# Patient Record
Sex: MALE | Race: WHITE | NOT HISPANIC OR LATINO | URBAN - NONMETROPOLITAN AREA
[De-identification: names, ages, dates, MRNs, and addresses within clinical notes are randomized per-mention and may not be internally consistent; named-entity substitution may affect disease eponyms.]

---

## 2020-07-23 ENCOUNTER — OFFICE VISIT (OUTPATIENT)
Dept: URBAN - NONMETROPOLITAN AREA CLINIC 2 | Facility: CLINIC | Age: 25
End: 2020-07-23
Payer: COMMERCIAL

## 2020-07-23 DIAGNOSIS — F50.2 BULIMIA: ICD-10-CM

## 2020-07-23 DIAGNOSIS — K59.01 CONSTIPATION BY DELAYED COLONIC TRANSIT: ICD-10-CM

## 2020-07-23 DIAGNOSIS — R10.84 GENERALIZED ABDOMINAL PAIN: ICD-10-CM

## 2020-07-23 PROCEDURE — G8420 CALC BMI NORM PARAMETERS: HCPCS | Performed by: NURSE PRACTITIONER

## 2020-07-23 PROCEDURE — 99204 OFFICE O/P NEW MOD 45 MIN: CPT | Performed by: NURSE PRACTITIONER

## 2020-07-23 PROCEDURE — G9903 PT SCRN TBCO ID AS NON USER: HCPCS | Performed by: NURSE PRACTITIONER

## 2020-07-23 PROCEDURE — G8427 DOCREV CUR MEDS BY ELIG CLIN: HCPCS | Performed by: NURSE PRACTITIONER

## 2020-07-23 RX ORDER — TRAZODONE HYDROCHLORIDE 100 MG/1
1 TABLET AT BEDTIME TABLET, FILM COATED ORAL ONCE A DAY
Status: ACTIVE | COMMUNITY

## 2020-07-23 RX ORDER — OXCARBAZEPINE 150 MG/1
2 TABLETS TABLET, FILM COATED ORAL TWICE A DAY
Status: ACTIVE | COMMUNITY

## 2020-07-23 RX ORDER — DULOXETINE 60 MG/1
1 CAPSULE CAPSULE, DELAYED RELEASE PELLETS ORAL ONCE A DAY
Status: ACTIVE | COMMUNITY

## 2020-07-23 NOTE — HPI-TODAY'S VISIT:
Mr. Arpit Cain is a 25 year old male here for abdominal pain and constipation. He has had constipation most of his life with a BM every 3-4 days. He has been trying to loss weight and eating low carb. He also hurt his back which has made his less active. He denies any narcotics. Over the last month, he has been having cramping after eating with bloating. He has diffuse abdominal pain. Having a BM sometimes helps. He has a hx of bulimia. He started at 15 and lost 75 pounds. He stopped for a few years and started again 2 years ago. He is now seeing a therapist and has stopped for the last 4 months. He denies any blood in his stool or melena. CS

## 2020-07-25 LAB
A/G RATIO: 2
ALBUMIN: 4.5
ALKALINE PHOSPHATASE: 89
ALT (SGPT): 29
AST (SGOT): 22
BASO (ABSOLUTE): 0.1
BASOS: 1
BILIRUBIN, TOTAL: 0.3
BUN/CREATININE RATIO: 12
BUN: 16
C-REACTIVE PROTEIN, QUANT: 3
CALCIUM: 9.2
CARBON DIOXIDE, TOTAL: 25
CHLORIDE: 99
CREATININE: 1.32
DEAMIDATED GLIADIN ABS, IGA: 6
DEAMIDATED GLIADIN ABS, IGG: 3
EGFR IF AFRICN AM: 85
EGFR IF NONAFRICN AM: 73
ENDOMYSIAL ANTIBODY IGA: NEGATIVE
EOS (ABSOLUTE): 0.2
EOS: 3
GLOBULIN, TOTAL: 2.2
GLUCOSE: 74
HEMATOCRIT: 44
HEMATOLOGY COMMENTS:: (no result)
HEMOGLOBIN: 14.6
IMMATURE CELLS: (no result)
IMMATURE GRANS (ABS): 0
IMMATURE GRANULOCYTES: 0
IMMUNOGLOBULIN A, QN, SERUM: 196
LYMPHS (ABSOLUTE): 2.8
LYMPHS: 37
MCH: 29.9
MCHC: 33.2
MCV: 90
MONOCYTES(ABSOLUTE): 0.6
MONOCYTES: 8
NEUTROPHILS (ABSOLUTE): 3.8
NEUTROPHILS: 51
NRBC: (no result)
PLATELETS: 280
POTASSIUM: 4
PROTEIN, TOTAL: 6.7
RBC: 4.89
RDW: 12.5
SEDIMENTATION RATE-WESTERGREN: 9
SODIUM: 140
T-TRANSGLUTAMINASE (TTG) IGA: <2
T-TRANSGLUTAMINASE (TTG) IGG: <2
TSH: 1.7
WBC: 7.5

## 2020-07-30 ENCOUNTER — WEB ENCOUNTER (OUTPATIENT)
Dept: URBAN - NONMETROPOLITAN AREA CLINIC 2 | Facility: CLINIC | Age: 25
End: 2020-07-30

## 2020-07-30 RX ORDER — DULOXETINE 60 MG/1
1 CAPSULE CAPSULE, DELAYED RELEASE PELLETS ORAL ONCE A DAY
Status: ACTIVE | COMMUNITY

## 2020-07-30 RX ORDER — DICYCLOMINE HYDROCHLORIDE 10 MG/1
1 TABLET 30 MINS PRIOR TO A MEAL FOR CRAMPING CAPSULE ORAL THREE TIMES A DAY
Qty: 90 TABLET | Refills: 6 | OUTPATIENT
Start: 2020-07-31 | End: 2021-02-25

## 2020-07-30 RX ORDER — OXCARBAZEPINE 150 MG/1
2 TABLETS TABLET, FILM COATED ORAL TWICE A DAY
Status: ACTIVE | COMMUNITY

## 2020-07-30 RX ORDER — TRAZODONE HYDROCHLORIDE 100 MG/1
1 TABLET AT BEDTIME TABLET, FILM COATED ORAL ONCE A DAY
Status: ACTIVE | COMMUNITY

## 2020-07-30 RX ORDER — ONDANSETRON HYDROCHLORIDE 4 MG/1
1 TABLET EVERY 6-8HRS AS NEEDED FOR NAUSEA TABLET, FILM COATED ORAL
Qty: 30 | Refills: 3 | OUTPATIENT
Start: 2020-07-31

## 2020-07-31 ENCOUNTER — WEB ENCOUNTER (OUTPATIENT)
Dept: URBAN - NONMETROPOLITAN AREA CLINIC 2 | Facility: CLINIC | Age: 25
End: 2020-07-31

## 2020-07-31 RX ORDER — ONDANSETRON HYDROCHLORIDE 4 MG/1
1 TABLET EVERY 6-8HRS AS NEEDED FOR NAUSEA TABLET, FILM COATED ORAL
Qty: 30 | Refills: 3
Start: 2020-07-31

## 2020-07-31 RX ORDER — DICYCLOMINE HYDROCHLORIDE 10 MG/1
1 TABLET 30 MINS PRIOR TO A MEAL FOR CRAMPING CAPSULE ORAL THREE TIMES A DAY
Qty: 90 TABLET | Refills: 6
Start: 2020-07-31

## 2020-08-27 ENCOUNTER — WEB ENCOUNTER (OUTPATIENT)
Dept: URBAN - NONMETROPOLITAN AREA CLINIC 2 | Facility: CLINIC | Age: 25
End: 2020-08-27

## 2020-08-27 ENCOUNTER — OFFICE VISIT (OUTPATIENT)
Dept: URBAN - NONMETROPOLITAN AREA CLINIC 2 | Facility: CLINIC | Age: 25
End: 2020-08-27
Payer: COMMERCIAL

## 2020-08-27 DIAGNOSIS — R10.84 GENERALIZED ABDOMINAL PAIN: ICD-10-CM

## 2020-08-27 DIAGNOSIS — K59.01 CONSTIPATION BY DELAYED COLONIC TRANSIT: ICD-10-CM

## 2020-08-27 DIAGNOSIS — F50.2 BULIMIA: ICD-10-CM

## 2020-08-27 PROCEDURE — 99213 OFFICE O/P EST LOW 20 MIN: CPT | Performed by: NURSE PRACTITIONER

## 2020-08-27 PROCEDURE — G8420 CALC BMI NORM PARAMETERS: HCPCS | Performed by: NURSE PRACTITIONER

## 2020-08-27 PROCEDURE — G9903 PT SCRN TBCO ID AS NON USER: HCPCS | Performed by: NURSE PRACTITIONER

## 2020-08-27 PROCEDURE — G8427 DOCREV CUR MEDS BY ELIG CLIN: HCPCS | Performed by: NURSE PRACTITIONER

## 2020-08-27 RX ORDER — OXCARBAZEPINE 150 MG/1
2 TABLETS TABLET, FILM COATED ORAL TWICE A DAY
Status: ACTIVE | COMMUNITY

## 2020-08-27 RX ORDER — DULOXETINE 60 MG/1
1 CAPSULE CAPSULE, DELAYED RELEASE PELLETS ORAL ONCE A DAY
Status: ACTIVE | COMMUNITY

## 2020-08-27 RX ORDER — ONDANSETRON HYDROCHLORIDE 4 MG/1
1 TABLET EVERY 6-8HRS AS NEEDED FOR NAUSEA TABLET, FILM COATED ORAL
Qty: 30 | Refills: 3 | Status: ACTIVE | COMMUNITY
Start: 2020-07-31

## 2020-08-27 RX ORDER — DICYCLOMINE HYDROCHLORIDE 10 MG/1
1 TABLET 30 MINS PRIOR TO A MEAL FOR CRAMPING CAPSULE ORAL THREE TIMES A DAY
Qty: 90 TABLET | Refills: 6 | Status: ACTIVE | COMMUNITY
Start: 2020-07-31

## 2020-08-27 RX ORDER — TRAZODONE HYDROCHLORIDE 100 MG/1
1 TABLET AT BEDTIME TABLET, FILM COATED ORAL ONCE A DAY
Status: ACTIVE | COMMUNITY

## 2020-08-27 NOTE — HPI-OTHER HISTORIES
7/23/2020  Mr. Arpit Cain is a 25 year old male here for abdominal pain and constipation. He has had constipation most of his life with a BM every 3-4 days. He has been trying to loss weight and eating low carb. He also hurt his back which has made his less active. He denies any narcotics. Over the last month, he has been having cramping after eating with bloating. He has diffuse abdominal pain. Having a BM sometimes helps. He has a hx of bulimia. He started at 15 and lost 75 pounds. He stopped for a few years and started again 2 years ago. He is now seeing a therapist and has stopped for the last 4 months. He denies any blood in his stool or melena. CS

## 2020-09-12 ENCOUNTER — WEB ENCOUNTER (OUTPATIENT)
Dept: URBAN - NONMETROPOLITAN AREA CLINIC 2 | Facility: CLINIC | Age: 25
End: 2020-09-12

## 2020-09-22 ENCOUNTER — TELEPHONE ENCOUNTER (OUTPATIENT)
Dept: URBAN - METROPOLITAN AREA CLINIC 92 | Facility: CLINIC | Age: 25
End: 2020-09-22

## 2020-10-08 ENCOUNTER — WEB ENCOUNTER (OUTPATIENT)
Dept: URBAN - NONMETROPOLITAN AREA CLINIC 2 | Facility: CLINIC | Age: 25
End: 2020-10-08

## 2020-10-08 ENCOUNTER — OFFICE VISIT (OUTPATIENT)
Dept: URBAN - NONMETROPOLITAN AREA CLINIC 2 | Facility: CLINIC | Age: 25
End: 2020-10-08
Payer: COMMERCIAL

## 2020-10-08 VITALS
HEIGHT: 70 IN | DIASTOLIC BLOOD PRESSURE: 86 MMHG | HEART RATE: 85 BPM | WEIGHT: 245 LBS | SYSTOLIC BLOOD PRESSURE: 123 MMHG | BODY MASS INDEX: 35.07 KG/M2

## 2020-10-08 DIAGNOSIS — K58.1 IRRITABLE BOWEL SYNDROME WITH CONSTIPATION: ICD-10-CM

## 2020-10-08 DIAGNOSIS — K59.01 CONSTIPATION BY DELAYED COLONIC TRANSIT: ICD-10-CM

## 2020-10-08 DIAGNOSIS — F50.2 BULIMIA: ICD-10-CM

## 2020-10-08 PROBLEM — 102614006: Status: ACTIVE | Noted: 2020-07-23

## 2020-10-08 PROCEDURE — G8420 CALC BMI NORM PARAMETERS: HCPCS | Performed by: NURSE PRACTITIONER

## 2020-10-08 PROCEDURE — G9903 PT SCRN TBCO ID AS NON USER: HCPCS | Performed by: NURSE PRACTITIONER

## 2020-10-08 PROCEDURE — 99213 OFFICE O/P EST LOW 20 MIN: CPT | Performed by: NURSE PRACTITIONER

## 2020-10-08 PROCEDURE — G8427 DOCREV CUR MEDS BY ELIG CLIN: HCPCS | Performed by: NURSE PRACTITIONER

## 2020-10-08 RX ORDER — TRAZODONE HYDROCHLORIDE 100 MG/1
1 TABLET AT BEDTIME TABLET, FILM COATED ORAL ONCE A DAY
Status: ACTIVE | COMMUNITY

## 2020-10-08 RX ORDER — PLECANATIDE 3 MG/1
1 TABLET TABLET ORAL ONCE A DAY
Qty: 90 | Refills: 3 | OUTPATIENT
Start: 2020-10-08 | End: 2021-10-03

## 2020-10-08 RX ORDER — DULOXETINE 60 MG/1
1 CAPSULE CAPSULE, DELAYED RELEASE PELLETS ORAL ONCE A DAY
Status: ACTIVE | COMMUNITY

## 2020-10-08 RX ORDER — OXCARBAZEPINE 150 MG/1
2 TABLETS TABLET, FILM COATED ORAL TWICE A DAY
Status: ACTIVE | COMMUNITY

## 2020-10-08 RX ORDER — ONDANSETRON HYDROCHLORIDE 4 MG/1
1 TABLET EVERY 6-8HRS AS NEEDED FOR NAUSEA TABLET, FILM COATED ORAL
Qty: 30 | Refills: 3 | Status: ACTIVE | COMMUNITY
Start: 2020-07-31

## 2020-10-08 RX ORDER — DICYCLOMINE HYDROCHLORIDE 10 MG/1
1 TABLET 30 MINS PRIOR TO A MEAL FOR CRAMPING CAPSULE ORAL THREE TIMES A DAY
Qty: 90 TABLET | Refills: 6 | Status: ACTIVE | COMMUNITY
Start: 2020-07-31

## 2020-10-08 NOTE — HPI-TODAY'S VISIT:
Mr. Arpit Cain is a 25 year old male here for f/u of abdominal pain and constipation. He was having more pain and constipation at his last OV. He had a CT scan that showed some inflammation/infection in his lungs. He was not having symptoms had COVID a month prior. His CT did not show any GI abnormalities. He was started on trulance at his last OV. This caused so diarrhea at first and then gave him a more normal BM. When his bowels were moving better his pain was better. He has noticed that dairy, greens, and sweetners cause more issues. CS

## 2020-10-08 NOTE — HPI-OTHER HISTORIES
7/23/2020  Mr. Arpit Cain is a 25 year old male here for abdominal pain and constipation. He has had constipation most of his life with a BM every 3-4 days. He has been trying to loss weight and eating low carb. He also hurt his back which has made his less active. He denies any narcotics. Over the last month, he has been having cramping after eating with bloating. He has diffuse abdominal pain. Having a BM sometimes helps. He has a hx of bulimia. He started at 15 and lost 75 pounds. He stopped for a few years and started again 2 years ago. He is now seeing a therapist and has stopped for the last 4 months. He denies any blood in his stool or melena. CS   8/27/2020 Mr. Aprit Cain is a 25 year old male here for f/u of abdominal pain and constipation. He had normal blood work at his last OV. He was started on the bowel regimen. He has been taking fiber and colace. He is not taking miralax. he was concerned about taking all of these. He is having more pain today. This is upper abdomen under his ribs and radiating into his back. He tried the bentyl and this did help but caused more constipation so he stopped it. CS

## 2021-01-11 ENCOUNTER — OFFICE VISIT (OUTPATIENT)
Dept: URBAN - NONMETROPOLITAN AREA CLINIC 2 | Facility: CLINIC | Age: 26
End: 2021-01-11

## 2021-01-27 ENCOUNTER — OFFICE VISIT (OUTPATIENT)
Dept: URBAN - NONMETROPOLITAN AREA CLINIC 2 | Facility: CLINIC | Age: 26
End: 2021-01-27

## 2021-02-04 ENCOUNTER — OFFICE VISIT (OUTPATIENT)
Dept: URBAN - NONMETROPOLITAN AREA CLINIC 2 | Facility: CLINIC | Age: 26
End: 2021-02-04
Payer: COMMERCIAL

## 2021-02-04 VITALS
DIASTOLIC BLOOD PRESSURE: 79 MMHG | HEIGHT: 70 IN | WEIGHT: 247 LBS | SYSTOLIC BLOOD PRESSURE: 123 MMHG | TEMPERATURE: 96.6 F | HEART RATE: 80 BPM | BODY MASS INDEX: 35.36 KG/M2

## 2021-02-04 DIAGNOSIS — F50.2 BULIMIA: ICD-10-CM

## 2021-02-04 DIAGNOSIS — K58.1 IRRITABLE BOWEL SYNDROME WITH CONSTIPATION: ICD-10-CM

## 2021-02-04 DIAGNOSIS — K59.01 CONSTIPATION BY DELAYED COLONIC TRANSIT: ICD-10-CM

## 2021-02-04 PROCEDURE — G8420 CALC BMI NORM PARAMETERS: HCPCS | Performed by: NURSE PRACTITIONER

## 2021-02-04 PROCEDURE — G9903 PT SCRN TBCO ID AS NON USER: HCPCS | Performed by: NURSE PRACTITIONER

## 2021-02-04 PROCEDURE — G8427 DOCREV CUR MEDS BY ELIG CLIN: HCPCS | Performed by: NURSE PRACTITIONER

## 2021-02-04 PROCEDURE — 99213 OFFICE O/P EST LOW 20 MIN: CPT | Performed by: NURSE PRACTITIONER

## 2021-02-04 RX ORDER — LAMOTRIGINE 100 MG/1
1 TABLET TABLET ORAL ONCE A DAY
Status: ACTIVE | COMMUNITY

## 2021-02-04 RX ORDER — CLONAZEPAM 0.5 MG/1
1 TABLET AT BEDTIME TABLET ORAL ONCE A DAY
Status: ACTIVE | COMMUNITY

## 2021-02-04 RX ORDER — OXCARBAZEPINE 150 MG/1
2 TABLETS TABLET, FILM COATED ORAL TWICE A DAY
Status: ON HOLD | COMMUNITY

## 2021-02-04 RX ORDER — PLECANATIDE 3 MG/1
1 TABLET TABLET ORAL ONCE A DAY
Qty: 90 | Refills: 3 | OUTPATIENT

## 2021-02-04 RX ORDER — DICYCLOMINE HYDROCHLORIDE 10 MG/1
1 TABLET 30 MINS PRIOR TO A MEAL FOR CRAMPING CAPSULE ORAL THREE TIMES A DAY
Qty: 90 TABLET | Refills: 6 | Status: ON HOLD | COMMUNITY
Start: 2020-07-31

## 2021-02-04 RX ORDER — ONDANSETRON HYDROCHLORIDE 4 MG/1
1 TABLET EVERY 6-8HRS AS NEEDED FOR NAUSEA TABLET, FILM COATED ORAL
Qty: 30 | Refills: 3 | Status: ON HOLD | COMMUNITY
Start: 2020-07-31

## 2021-02-04 RX ORDER — TRAZODONE HYDROCHLORIDE 100 MG/1
1 TABLET AT BEDTIME TABLET, FILM COATED ORAL ONCE A DAY
Status: ACTIVE | COMMUNITY

## 2021-02-04 RX ORDER — RIFAXIMIN 550 MG/1
1 TABLET TABLET ORAL THREE TIMES A DAY
Qty: 42 TABLET | Refills: 2 | OUTPATIENT
Start: 2021-02-04 | End: 2021-03-18

## 2021-02-04 RX ORDER — DULOXETINE 60 MG/1
1 CAPSULE CAPSULE, DELAYED RELEASE PELLETS ORAL ONCE A DAY
Status: ACTIVE | COMMUNITY

## 2021-02-04 RX ORDER — PLECANATIDE 3 MG/1
1 TABLET TABLET ORAL ONCE A DAY
Qty: 90 | Refills: 3 | Status: ON HOLD | COMMUNITY
Start: 2020-10-08 | End: 2021-10-03

## 2021-02-04 NOTE — HPI-TODAY'S VISIT:
2/4/2021 Mr. Arpit Cain is here for f/u of IBS-C. He has been on trulance with good result. He has a BM most days. He will some days and then have diarrhea but this is rare. He continues to have a lot of gas and bloating. He has noticed that dairy, greens, and sweetners cause more issues. He has been trying to follow the low FODMAP diet but would like help.  He is hoping to join a gym soon. He is in school [employer]. CS

## 2021-02-04 NOTE — HPI-OTHER HISTORIES
7/23/2020  Mr. Arpit Cain is a 25 year old male here for abdominal pain and constipation. He has had constipation most of his life with a BM every 3-4 days. He has been trying to loss weight and eating low carb. He also hurt his back which has made his less active. He denies any narcotics. Over the last month, he has been having cramping after eating with bloating. He has diffuse abdominal pain. Having a BM sometimes helps. He has a hx of bulimia. He started at 15 and lost 75 pounds. He stopped for a few years and started again 2 years ago. He is now seeing a therapist and has stopped for the last 4 months. He denies any blood in his stool or melena. CS   8/27/2020 Mr. Arpit Cain is a 25 year old male here for f/u of abdominal pain and constipation. He had normal blood work at his last OV. He was started on the bowel regimen. He has been taking fiber and colace. He is not taking miralax. he was concerned about taking all of these. He is having more pain today. This is upper abdomen under his ribs and radiating into his back. He tried the bentyl and this did help but caused more constipation so he stopped it. CS   10/8/2020 Mr. Arpit Cain is a 25 year old male here for f/u of abdominal pain and constipation. He was having more pain and constipation at his last OV. He had a CT scan that showed some inflammation/infection in his lungs. He was not having symptoms had COVID a month prior. His CT did not show any GI abnormalities. He was started on trulance at his last OV. This caused so diarrhea at first and then gave him a more normal BM. When his bowels were moving better his pain was better. He has noticed that dairy, greens, and sweetners cause more issues. CS

## 2021-02-18 ENCOUNTER — OFFICE VISIT (OUTPATIENT)
Dept: URBAN - METROPOLITAN AREA TELEHEALTH 2 | Facility: TELEHEALTH | Age: 26
End: 2021-02-18

## 2021-04-26 ENCOUNTER — LAB OUTSIDE AN ENCOUNTER (OUTPATIENT)
Dept: URBAN - NONMETROPOLITAN AREA CLINIC 2 | Facility: CLINIC | Age: 26
End: 2021-04-26

## 2021-04-26 ENCOUNTER — OFFICE VISIT (OUTPATIENT)
Dept: URBAN - NONMETROPOLITAN AREA CLINIC 2 | Facility: CLINIC | Age: 26
End: 2021-04-26
Payer: COMMERCIAL

## 2021-04-26 VITALS
HEIGHT: 70 IN | WEIGHT: 256 LBS | BODY MASS INDEX: 36.65 KG/M2 | TEMPERATURE: 97 F | HEART RATE: 69 BPM | SYSTOLIC BLOOD PRESSURE: 142 MMHG | DIASTOLIC BLOOD PRESSURE: 91 MMHG

## 2021-04-26 DIAGNOSIS — K59.01 CONSTIPATION BY DELAYED COLONIC TRANSIT: ICD-10-CM

## 2021-04-26 DIAGNOSIS — F50.2 BULIMIA: ICD-10-CM

## 2021-04-26 DIAGNOSIS — A04.8 H. PYLORI INFECTION: ICD-10-CM

## 2021-04-26 DIAGNOSIS — K62.5 RECTAL BLEEDING: ICD-10-CM

## 2021-04-26 DIAGNOSIS — K58.1 IRRITABLE BOWEL SYNDROME WITH CONSTIPATION: ICD-10-CM

## 2021-04-26 PROCEDURE — 99214 OFFICE O/P EST MOD 30 MIN: CPT | Performed by: NURSE PRACTITIONER

## 2021-04-26 RX ORDER — TRAZODONE HYDROCHLORIDE 100 MG/1
1 TABLET AT BEDTIME TABLET, FILM COATED ORAL ONCE A DAY
Status: ACTIVE | COMMUNITY

## 2021-04-26 RX ORDER — LAMOTRIGINE 100 MG/1
1 TABLET TABLET ORAL ONCE A DAY
Status: ACTIVE | COMMUNITY

## 2021-04-26 RX ORDER — ONDANSETRON HYDROCHLORIDE 4 MG/1
1 TABLET EVERY 6-8HRS AS NEEDED FOR NAUSEA TABLET, FILM COATED ORAL
Qty: 30 | Refills: 3 | Status: ON HOLD | COMMUNITY
Start: 2020-07-31

## 2021-04-26 RX ORDER — DICYCLOMINE HYDROCHLORIDE 10 MG/1
1 TABLET 30 MINS PRIOR TO A MEAL FOR CRAMPING CAPSULE ORAL THREE TIMES A DAY
Qty: 90 TABLET | Refills: 6 | Status: ON HOLD | COMMUNITY
Start: 2020-07-31

## 2021-04-26 RX ORDER — OXCARBAZEPINE 150 MG/1
2 TABLETS TABLET, FILM COATED ORAL TWICE A DAY
Status: ON HOLD | COMMUNITY

## 2021-04-26 RX ORDER — PLECANATIDE 3 MG/1
1 TABLET TABLET ORAL ONCE A DAY
Qty: 90 | Refills: 3 | OUTPATIENT

## 2021-04-26 RX ORDER — HYDROCORTISONE ACETATE AND PRAMOXINE HYDROCHLORIDE 25; 10 MG/G; MG/G
1 APPLICATION CREAM TOPICAL THREE TIMES A DAY
Qty: 1 TUBE | Refills: 3 | OUTPATIENT
Start: 2021-04-26 | End: 2021-06-21

## 2021-04-26 RX ORDER — PLECANATIDE 3 MG/1
1 TABLET TABLET ORAL ONCE A DAY
Qty: 90 | Refills: 3 | Status: ACTIVE | COMMUNITY

## 2021-04-26 RX ORDER — DULOXETINE 60 MG/1
1 CAPSULE CAPSULE, DELAYED RELEASE PELLETS ORAL ONCE A DAY
Status: ACTIVE | COMMUNITY

## 2021-04-26 RX ORDER — BUPROPION HYDROCHLORIDE 300 MG/1
1 TABLET IN THE MORNING TABLET, EXTENDED RELEASE ORAL ONCE A DAY
Status: ACTIVE | COMMUNITY

## 2021-04-26 RX ORDER — CLONAZEPAM 0.5 MG/1
1 TABLET AT BEDTIME TABLET ORAL ONCE A DAY
Status: ACTIVE | COMMUNITY

## 2021-04-26 NOTE — HPI-TODAY'S VISIT:
4/26/2021 Mr. Arpit Cain is here for f/u of IBS-C. He had been on trulance with good result until recently. He is going a week at times between BM. He would like to come off it. He is having a lot of gas and bloating. The xifaxan did not help. He did not like modify health. He is taking with a friend of his that is a nutritionist. He has been having some rectal bleeding and itching. He thinks he may have hemorrhoids.  CS

## 2021-04-26 NOTE — HPI-OTHER HISTORIES
7/23/2020  Mr. Arpit Cain is a 25 year old male here for abdominal pain and constipation. He has had constipation most of his life with a BM every 3-4 days. He has been trying to loss weight and eating low carb. He also hurt his back which has made his less active. He denies any narcotics. Over the last month, he has been having cramping after eating with bloating. He has diffuse abdominal pain. Having a BM sometimes helps. He has a hx of bulimia. He started at 15 and lost 75 pounds. He stopped for a few years and started again 2 years ago. He is now seeing a therapist and has stopped for the last 4 months. He denies any blood in his stool or melena. CS   8/27/2020 Mr. Arpit Cain is a 25 year old male here for f/u of abdominal pain and constipation. He had normal blood work at his last OV. He was started on the bowel regimen. He has been taking fiber and colace. He is not taking miralax. he was concerned about taking all of these. He is having more pain today. This is upper abdomen under his ribs and radiating into his back. He tried the bentyl and this did help but caused more constipation so he stopped it. CS   10/8/2020 Mr. Arpit Cain is a 25 year old male here for f/u of abdominal pain and constipation. He was having more pain and constipation at his last OV. He had a CT scan that showed some inflammation/infection in his lungs. He was not having symptoms had COVID a month prior. His CT did not show any GI abnormalities. He was started on trulance at his last OV. This caused so diarrhea at first and then gave him a more normal BM. When his bowels were moving better his pain was better. He has noticed that dairy, greens, and sweetners cause more issues. CS   2/4/2021 Mr. Arpit Cain is here for f/u of IBS-C. He has been on trulance with good result. He has a BM most days. He will some days and then have diarrhea but this is rare. He continues to have a lot of gas and bloating. He has noticed that dairy, greens, and sweetners cause more issues. He has been trying to follow the low FODMAP diet but would like help.  He is hoping to join a gym soon. He is in [employer]. CS

## 2021-05-05 ENCOUNTER — OFFICE VISIT (OUTPATIENT)
Dept: URBAN - NONMETROPOLITAN AREA CLINIC 2 | Facility: CLINIC | Age: 26
End: 2021-05-05

## 2021-05-05 ENCOUNTER — TELEPHONE ENCOUNTER (OUTPATIENT)
Dept: URBAN - NONMETROPOLITAN AREA CLINIC 2 | Facility: CLINIC | Age: 26
End: 2021-05-05

## 2021-05-10 ENCOUNTER — WEB ENCOUNTER (OUTPATIENT)
Dept: URBAN - NONMETROPOLITAN AREA CLINIC 2 | Facility: CLINIC | Age: 26
End: 2021-05-10

## 2021-05-21 ENCOUNTER — OFFICE VISIT (OUTPATIENT)
Dept: URBAN - METROPOLITAN AREA TELEHEALTH 2 | Facility: TELEHEALTH | Age: 26
End: 2021-05-21
Payer: COMMERCIAL

## 2021-05-21 DIAGNOSIS — K62.5 RECTAL BLEEDING: ICD-10-CM

## 2021-05-21 DIAGNOSIS — A04.8 H. PYLORI INFECTION: ICD-10-CM

## 2021-05-21 DIAGNOSIS — K58.1 IRRITABLE BOWEL SYNDROME WITH CONSTIPATION: ICD-10-CM

## 2021-05-21 DIAGNOSIS — K59.01 CONSTIPATION BY DELAYED COLONIC TRANSIT: ICD-10-CM

## 2021-05-21 DIAGNOSIS — F50.2 BULIMIA: ICD-10-CM

## 2021-05-21 PROCEDURE — 99213 OFFICE O/P EST LOW 20 MIN: CPT | Performed by: INTERNAL MEDICINE

## 2021-05-21 RX ORDER — OXCARBAZEPINE 150 MG/1
2 TABLETS TABLET, FILM COATED ORAL TWICE A DAY
Status: ON HOLD | COMMUNITY

## 2021-05-21 RX ORDER — HYDROCORTISONE ACETATE AND PRAMOXINE HYDROCHLORIDE 25; 10 MG/G; MG/G
1 APPLICATION CREAM TOPICAL THREE TIMES A DAY
Qty: 1 TUBE | Refills: 3 | OUTPATIENT

## 2021-05-21 RX ORDER — DULOXETINE 60 MG/1
1 CAPSULE CAPSULE, DELAYED RELEASE PELLETS ORAL ONCE A DAY
Status: ACTIVE | COMMUNITY

## 2021-05-21 RX ORDER — ONDANSETRON HYDROCHLORIDE 4 MG/1
1 TABLET EVERY 6-8HRS AS NEEDED FOR NAUSEA TABLET, FILM COATED ORAL
Qty: 30 | Refills: 3
Start: 2020-07-31

## 2021-05-21 RX ORDER — LAMOTRIGINE 100 MG/1
1 TABLET TABLET ORAL ONCE A DAY
Status: ACTIVE | COMMUNITY

## 2021-05-21 RX ORDER — CLONAZEPAM 0.5 MG/1
1 TABLET AT BEDTIME TABLET ORAL ONCE A DAY
Status: ACTIVE | COMMUNITY

## 2021-05-21 RX ORDER — PLECANATIDE 3 MG/1
1 TABLET TABLET ORAL ONCE A DAY
Qty: 90 | Refills: 3 | Status: ACTIVE | COMMUNITY

## 2021-05-21 RX ORDER — TRAZODONE HYDROCHLORIDE 100 MG/1
1 TABLET AT BEDTIME TABLET, FILM COATED ORAL ONCE A DAY
Status: ACTIVE | COMMUNITY

## 2021-05-21 RX ORDER — ONDANSETRON HYDROCHLORIDE 4 MG/1
1 TABLET EVERY 6-8HRS AS NEEDED FOR NAUSEA TABLET, FILM COATED ORAL
Qty: 30 | Refills: 3 | Status: ON HOLD | COMMUNITY
Start: 2020-07-31

## 2021-05-21 RX ORDER — BUPROPION HYDROCHLORIDE 300 MG/1
1 TABLET IN THE MORNING TABLET, EXTENDED RELEASE ORAL ONCE A DAY
Status: ACTIVE | COMMUNITY

## 2021-05-21 RX ORDER — DICYCLOMINE HYDROCHLORIDE 10 MG/1
1 TABLET 30 MINS PRIOR TO A MEAL FOR CRAMPING CAPSULE ORAL THREE TIMES A DAY
Qty: 90 TABLET | Refills: 6 | Status: ON HOLD | COMMUNITY
Start: 2020-07-31

## 2021-05-21 RX ORDER — HYDROCORTISONE ACETATE AND PRAMOXINE HYDROCHLORIDE 25; 10 MG/G; MG/G
1 APPLICATION CREAM TOPICAL THREE TIMES A DAY
Qty: 1 TUBE | Refills: 3 | Status: ACTIVE | COMMUNITY
Start: 2021-04-26 | End: 2021-06-21

## 2021-05-21 RX ORDER — PLECANATIDE 3 MG/1
1 TABLET TABLET ORAL ONCE A DAY
Qty: 90 | Refills: 3 | OUTPATIENT

## 2021-05-21 NOTE — HPI-OTHER HISTORIES
7/23/2020  Mr. Arpit Cain is a 25 year old male here for abdominal pain and constipation. He has had constipation most of his life with a BM every 3-4 days. He has been trying to loss weight and eating low carb. He also hurt his back which has made his less active. He denies any narcotics. Over the last month, he has been having cramping after eating with bloating. He has diffuse abdominal pain. Having a BM sometimes helps. He has a hx of bulimia. He started at 15 and lost 75 pounds. He stopped for a few years and started again 2 years ago. He is now seeing a therapist and has stopped for the last 4 months. He denies any blood in his stool or melena. CS   8/27/2020 Mr. Arpit Cain is a 25 year old male here for f/u of abdominal pain and constipation. He had normal blood work at his last OV. He was started on the bowel regimen. He has been taking fiber and colace. He is not taking miralax. he was concerned about taking all of these. He is having more pain today. This is upper abdomen under his ribs and radiating into his back. He tried the bentyl and this did help but caused more constipation so he stopped it. CS   10/8/2020 Mr. Arpit Cain is a 25 year old male here for f/u of abdominal pain and constipation. He was having more pain and constipation at his last OV. He had a CT scan that showed some inflammation/infection in his lungs. He was not having symptoms had COVID a month prior. His CT did not show any GI abnormalities. He was started on trulance at his last OV. This caused so diarrhea at first and then gave him a more normal BM. When his bowels were moving better his pain was better. He has noticed that dairy, greens, and sweetners cause more issues. CS   2/4/2021 Mr. Arpit Cain is here for f/u of IBS-C. He has been on trulance with good result. He has a BM most days. He will some days and then have diarrhea but this is rare. He continues to have a lot of gas and bloating. He has noticed that dairy, greens, and sweetners cause more issues. He has been trying to follow the low FODMAP diet but would like help.  He is hoping to join a gym soon. He is in school [employer]. CS   4/26/2021 Mr. Arpit Cain is here for f/u of IBS-C. He had been on trulance with good result until recently. He is going a week at times between BM. He would like to come off it. He is having a lot of gas and bloating. The xifaxan did not help. He did not like modify health. He is taking with a friend of his that is a nutritionist. He has been having some rectal bleeding and itching. He thinks he may have hemorrhoids.  CS

## 2021-05-21 NOTE — HPI-TODAY'S VISIT:
5/21/2021 Mr. Arpit Cain is evaluated via telehealth for f/u of IBS. He had been constipation dominant until most recently. He was having a lot of diarrhea with trulance and linzess. He is now off this and feeling better. He continues to have cramping and nausea. Bentyl and zofran help with this. He has been diagnosed with central sleep apnea and getting a machine soon. He has cancelled his colonoscopy for now until he gets his sleep apnea better controlled.  He denies any further bleeding. CS

## 2021-05-24 ENCOUNTER — OFFICE VISIT (OUTPATIENT)
Dept: URBAN - NONMETROPOLITAN AREA SURGERY CENTER 1 | Facility: SURGERY CENTER | Age: 26
End: 2021-05-24

## 2021-08-04 ENCOUNTER — OFFICE VISIT (OUTPATIENT)
Dept: URBAN - NONMETROPOLITAN AREA CLINIC 2 | Facility: CLINIC | Age: 26
End: 2021-08-04

## 2021-09-13 ENCOUNTER — OFFICE VISIT (OUTPATIENT)
Dept: URBAN - NONMETROPOLITAN AREA CLINIC 2 | Facility: CLINIC | Age: 26
End: 2021-09-13
Payer: COMMERCIAL

## 2021-09-13 VITALS
DIASTOLIC BLOOD PRESSURE: 77 MMHG | SYSTOLIC BLOOD PRESSURE: 129 MMHG | HEIGHT: 70 IN | WEIGHT: 248.4 LBS | BODY MASS INDEX: 35.56 KG/M2 | HEART RATE: 79 BPM | TEMPERATURE: 97 F

## 2021-09-13 DIAGNOSIS — K59.01 CONSTIPATION BY DELAYED COLONIC TRANSIT: ICD-10-CM

## 2021-09-13 DIAGNOSIS — K58.1 IRRITABLE BOWEL SYNDROME WITH CONSTIPATION: ICD-10-CM

## 2021-09-13 DIAGNOSIS — A04.8 H. PYLORI INFECTION: ICD-10-CM

## 2021-09-13 DIAGNOSIS — K62.5 RECTAL BLEEDING: ICD-10-CM

## 2021-09-13 DIAGNOSIS — F50.2 BULIMIA: ICD-10-CM

## 2021-09-13 PROCEDURE — 99213 OFFICE O/P EST LOW 20 MIN: CPT | Performed by: INTERNAL MEDICINE

## 2021-09-13 RX ORDER — PLECANATIDE 3 MG/1
1 TABLET TABLET ORAL ONCE A DAY
Qty: 90 | Refills: 3 | OUTPATIENT

## 2021-09-13 RX ORDER — PLECANATIDE 3 MG/1
1 TABLET TABLET ORAL ONCE A DAY
Qty: 90 | Refills: 3 | COMMUNITY

## 2021-09-13 RX ORDER — HYDROCORTISONE ACETATE AND PRAMOXINE HYDROCHLORIDE 25; 10 MG/G; MG/G
1 APPLICATION CREAM TOPICAL THREE TIMES A DAY
Qty: 1 TUBE | Refills: 3 | COMMUNITY

## 2021-09-13 RX ORDER — ONDANSETRON HYDROCHLORIDE 4 MG/1
1 TABLET EVERY 6-8HRS AS NEEDED FOR NAUSEA TABLET, FILM COATED ORAL
Qty: 30 | Refills: 3 | COMMUNITY
Start: 2020-07-31

## 2021-09-13 RX ORDER — LAMOTRIGINE 100 MG/1
1 TABLET TABLET ORAL ONCE A DAY
COMMUNITY

## 2021-09-13 RX ORDER — OXCARBAZEPINE 150 MG/1
2 TABLETS TABLET, FILM COATED ORAL TWICE A DAY
COMMUNITY

## 2021-09-13 RX ORDER — BUPROPION HYDROCHLORIDE 300 MG/1
1 TABLET IN THE MORNING TABLET, EXTENDED RELEASE ORAL ONCE A DAY
COMMUNITY

## 2021-09-13 RX ORDER — CLONAZEPAM 0.5 MG/1
1 TABLET AT BEDTIME TABLET ORAL ONCE A DAY
COMMUNITY

## 2021-09-13 RX ORDER — DICYCLOMINE HYDROCHLORIDE 10 MG/1
1 TABLET 30 MINS PRIOR TO A MEAL FOR CRAMPING CAPSULE ORAL THREE TIMES A DAY
Qty: 90 TABLET | Refills: 6 | COMMUNITY
Start: 2020-07-31

## 2021-09-13 RX ORDER — DULOXETINE 60 MG/1
1 CAPSULE CAPSULE, DELAYED RELEASE PELLETS ORAL ONCE A DAY
COMMUNITY

## 2021-09-13 RX ORDER — ONDANSETRON HYDROCHLORIDE 4 MG/1
1 TABLET EVERY 6-8HRS AS NEEDED FOR NAUSEA TABLET, FILM COATED ORAL
Qty: 30 | Refills: 3

## 2021-09-13 RX ORDER — HYDROCORTISONE ACETATE AND PRAMOXINE HYDROCHLORIDE 25; 10 MG/G; MG/G
1 APPLICATION CREAM TOPICAL THREE TIMES A DAY
Qty: 1 TUBE | Refills: 3 | OUTPATIENT

## 2021-09-13 RX ORDER — TRAZODONE HYDROCHLORIDE 100 MG/1
1 TABLET AT BEDTIME TABLET, FILM COATED ORAL ONCE A DAY
COMMUNITY

## 2021-09-13 NOTE — HPI-OTHER HISTORIES
7/23/2020  Mr. Arpit Cain is a 25 year old male here for abdominal pain and constipation. He has had constipation most of his life with a BM every 3-4 days. He has been trying to loss weight and eating low carb. He also hurt his back which has made his less active. He denies any narcotics. Over the last month, he has been having cramping after eating with bloating. He has diffuse abdominal pain. Having a BM sometimes helps. He has a hx of bulimia. He started at 15 and lost 75 pounds. He stopped for a few years and started again 2 years ago. He is now seeing a therapist and has stopped for the last 4 months. He denies any blood in his stool or melena. CS   8/27/2020 Mr. Arpit Cain is a 25 year old male here for f/u of abdominal pain and constipation. He had normal blood work at his last OV. He was started on the bowel regimen. He has been taking fiber and colace. He is not taking miralax. he was concerned about taking all of these. He is having more pain today. This is upper abdomen under his ribs and radiating into his back. He tried the bentyl and this did help but caused more constipation so he stopped it. CS   10/8/2020 Mr. Arpit Cain is a 25 year old male here for f/u of abdominal pain and constipation. He was having more pain and constipation at his last OV. He had a CT scan that showed some inflammation/infection in his lungs. He was not having symptoms had COVID a month prior. His CT did not show any GI abnormalities. He was started on trulance at his last OV. This caused so diarrhea at first and then gave him a more normal BM. When his bowels were moving better his pain was better. He has noticed that dairy, greens, and sweetners cause more issues. CS   2/4/2021 Mr. Arpit Cain is here for f/u of IBS-C. He has been on trulance with good result. He has a BM most days. He will some days and then have diarrhea but this is rare. He continues to have a lot of gas and bloating. He has noticed that dairy, greens, and sweetners cause more issues. He has been trying to follow the low FODMAP diet but would like help.  He is hoping to join a gym soon. He is in school [employer]. CS   4/26/2021 Mr. Arpit Cain is here for f/u of IBS-C. He had been on trulance with good result until recently. He is going a week at times between BM. He would like to come off it. He is having a lot of gas and bloating. The xifaxan did not help. He did not like modify health. He is taking with a friend of his that is a nutritionist. He has been having some rectal bleeding and itching. He thinks he may have hemorrhoids.  CS   5/21/2021 Mr. Arpit Cain is evaluated via telehealth for f/u of IBS. He had been constipation dominant until most recently. He was having a lot of diarrhea with trulance and linzess. He is now off this and feeling better. He continues to have cramping and nausea. Bentyl and zofran help with this. He has been diagnosed with central sleep apnea and getting a machine soon. He has cancelled his colonoscopy for now until he gets his sleep apnea better controlled.  He denies any further bleeding. CS

## 2021-09-13 NOTE — HPI-TODAY'S VISIT:
9/13/2021 Mr. Arpit Cain is here for f/u of IBS. He has constipation more often. He has been eating popcorn, walking more and drinking more water. He has been doing well and having mostly normal stool. He will still have some mucous but this is mainly wiht constipaiton. He had some green stool for a few days and then resolved. He has somce cramping in the morning after eating. Bentyl helps. Overall, he is feeling well and enjoying his job at [employer] and wanting to be a . CS

## 2022-03-07 ENCOUNTER — OFFICE VISIT (OUTPATIENT)
Dept: URBAN - NONMETROPOLITAN AREA CLINIC 2 | Facility: CLINIC | Age: 27
End: 2022-03-07
Payer: COMMERCIAL

## 2022-03-07 VITALS
SYSTOLIC BLOOD PRESSURE: 128 MMHG | TEMPERATURE: 97.6 F | BODY MASS INDEX: 35.88 KG/M2 | DIASTOLIC BLOOD PRESSURE: 79 MMHG | HEART RATE: 73 BPM | HEIGHT: 70 IN | WEIGHT: 250.6 LBS

## 2022-03-07 DIAGNOSIS — F50.2 BULIMIA: ICD-10-CM

## 2022-03-07 DIAGNOSIS — K62.5 RECTAL BLEEDING: ICD-10-CM

## 2022-03-07 DIAGNOSIS — A04.8 H. PYLORI INFECTION: ICD-10-CM

## 2022-03-07 DIAGNOSIS — K58.1 IRRITABLE BOWEL SYNDROME WITH CONSTIPATION: ICD-10-CM

## 2022-03-07 DIAGNOSIS — K59.01 CONSTIPATION BY DELAYED COLONIC TRANSIT: ICD-10-CM

## 2022-03-07 PROBLEM — 78004001: Status: ACTIVE | Noted: 2020-07-23

## 2022-03-07 PROCEDURE — 99213 OFFICE O/P EST LOW 20 MIN: CPT | Performed by: NURSE PRACTITIONER

## 2022-03-07 RX ORDER — ONDANSETRON HYDROCHLORIDE 4 MG/1
1 TABLET EVERY 6-8HRS AS NEEDED FOR NAUSEA TABLET, FILM COATED ORAL
Qty: 30 | Refills: 3

## 2022-03-07 RX ORDER — ONDANSETRON HYDROCHLORIDE 4 MG/1
1 TABLET EVERY 6-8HRS AS NEEDED FOR NAUSEA TABLET, FILM COATED ORAL
Qty: 30 | Refills: 3 | Status: ACTIVE | COMMUNITY

## 2022-03-07 RX ORDER — BUPROPION HYDROCHLORIDE 300 MG/1
1 TABLET IN THE MORNING TABLET, EXTENDED RELEASE ORAL ONCE A DAY
COMMUNITY

## 2022-03-07 RX ORDER — DICYCLOMINE HYDROCHLORIDE 10 MG/1
1 TABLET 30 MINS PRIOR TO A MEAL FOR CRAMPING CAPSULE ORAL THREE TIMES A DAY
Qty: 90 TABLET | Refills: 6 | COMMUNITY
Start: 2020-07-31

## 2022-03-07 RX ORDER — HYDROCORTISONE ACETATE AND PRAMOXINE HYDROCHLORIDE 25; 10 MG/G; MG/G
1 APPLICATION CREAM TOPICAL THREE TIMES A DAY
Qty: 1 TUBE | Refills: 3 | Status: ACTIVE | COMMUNITY

## 2022-03-07 RX ORDER — TRAZODONE HYDROCHLORIDE 100 MG/1
1 TABLET AT BEDTIME TABLET, FILM COATED ORAL ONCE A DAY
COMMUNITY

## 2022-03-07 RX ORDER — LAMOTRIGINE 100 MG/1
1 TABLET TABLET ORAL ONCE A DAY
COMMUNITY

## 2022-03-07 RX ORDER — OXCARBAZEPINE 150 MG/1
2 TABLETS TABLET, FILM COATED ORAL TWICE A DAY
COMMUNITY

## 2022-03-07 RX ORDER — CLONAZEPAM 0.5 MG/1
1 TABLET AT BEDTIME TABLET ORAL ONCE A DAY
COMMUNITY

## 2022-03-07 RX ORDER — PLECANATIDE 3 MG/1
1 TABLET TABLET ORAL ONCE A DAY
Qty: 90 | Refills: 3 | Status: ACTIVE | COMMUNITY

## 2022-03-07 RX ORDER — DULOXETINE 60 MG/1
1 CAPSULE CAPSULE, DELAYED RELEASE PELLETS ORAL ONCE A DAY
COMMUNITY

## 2022-03-07 NOTE — HPI-TODAY'S VISIT:
9/13/2021 Mr. Arpit Cain is here for f/u of IBS. He has constipation more often. He has been drinking more water and exercising more. His stools are often incomplete and he will have more bloating. He knows his stress and anxiety contribute. He is using more zofran. He has not needed the bentyl.  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 CS

## 2022-09-07 ENCOUNTER — OFFICE VISIT (OUTPATIENT)
Dept: URBAN - NONMETROPOLITAN AREA CLINIC 2 | Facility: CLINIC | Age: 27
End: 2022-09-07

## 2023-07-24 ENCOUNTER — TELEPHONE ENCOUNTER (OUTPATIENT)
Dept: URBAN - NONMETROPOLITAN AREA CLINIC 2 | Facility: CLINIC | Age: 28
End: 2023-07-24

## 2023-07-24 ENCOUNTER — OFFICE VISIT (OUTPATIENT)
Dept: URBAN - NONMETROPOLITAN AREA CLINIC 13 | Facility: CLINIC | Age: 28
End: 2023-07-24
Payer: COMMERCIAL

## 2023-07-24 VITALS
HEART RATE: 99 BPM | BODY MASS INDEX: 30.78 KG/M2 | TEMPERATURE: 97.5 F | HEIGHT: 70 IN | WEIGHT: 215 LBS | SYSTOLIC BLOOD PRESSURE: 118 MMHG | DIASTOLIC BLOOD PRESSURE: 80 MMHG

## 2023-07-24 DIAGNOSIS — K62.5 RECTAL BLEEDING: ICD-10-CM

## 2023-07-24 DIAGNOSIS — K58.1 IRRITABLE BOWEL SYNDROME WITH CONSTIPATION: ICD-10-CM

## 2023-07-24 DIAGNOSIS — A04.8 OTHER SPECIFIED BACTERIAL INTESTINAL INFECTIONS: ICD-10-CM

## 2023-07-24 PROCEDURE — 99213 OFFICE O/P EST LOW 20 MIN: CPT | Performed by: INTERNAL MEDICINE

## 2023-07-24 RX ORDER — PLECANATIDE 3 MG/1
1 TABLET TABLET ORAL ONCE A DAY
Qty: 90 | Refills: 3 | Status: ACTIVE | COMMUNITY

## 2023-07-24 RX ORDER — LUBIPROSTONE 24 UG/1
1 CAPSULE WITH FOOD AND WATER CAPSULE, GELATIN COATED ORAL TWICE A DAY
Qty: 60 | Refills: 3 | OUTPATIENT
Start: 2023-07-27 | End: 2023-11-23

## 2023-07-24 RX ORDER — AMITRIPTYLINE HYDROCHLORIDE 10 MG/1
1 TABLET AT BEDTIME TABLET, FILM COATED ORAL ONCE A DAY
Qty: 30 | Refills: 11 | OUTPATIENT
Start: 2023-07-24

## 2023-07-24 RX ORDER — LAMOTRIGINE 150 MG/1
TAKE 1 TABLET BY MOUTH ONCE DAILY TABLET ORAL
Qty: 90 EACH | Refills: 0 | Status: ACTIVE | COMMUNITY

## 2023-07-24 RX ORDER — TRAZODONE HYDROCHLORIDE 150 MG/1
TABLET ORAL
Qty: 90 TABLET | Status: ACTIVE | COMMUNITY

## 2023-07-24 RX ORDER — CYCLOBENZAPRINE HYDROCHLORIDE 10 MG/1
TABLET, FILM COATED ORAL
Qty: 30 TABLET | Status: ACTIVE | COMMUNITY

## 2023-07-24 RX ORDER — HYDROCORTISONE ACETATE AND PRAMOXINE HYDROCHLORIDE 25; 10 MG/G; MG/G
1 APPLICATION CREAM TOPICAL THREE TIMES A DAY
Qty: 1 TUBE | Refills: 3 | Status: ACTIVE | COMMUNITY

## 2023-07-24 RX ORDER — PRUCALOPRIDE 2 MG/1
1 TABLET TABLET, FILM COATED ORAL ONCE A DAY
Qty: 90 TABLET | Refills: 3 | OUTPATIENT
Start: 2023-07-24 | End: 2024-07-18

## 2023-07-24 RX ORDER — ONDANSETRON HYDROCHLORIDE 4 MG/1
1 TABLET EVERY 6-8HRS AS NEEDED FOR NAUSEA TABLET, FILM COATED ORAL
Qty: 30 | Refills: 3 | Status: ACTIVE | COMMUNITY

## 2023-07-24 RX ORDER — ONDANSETRON HYDROCHLORIDE 4 MG/1
1 TABLET EVERY 6-8HRS AS NEEDED FOR NAUSEA TABLET, FILM COATED ORAL
Qty: 30 | Refills: 3

## 2023-07-24 RX ORDER — DICYCLOMINE HYDROCHLORIDE 10 MG/1
1 TABLET 30 MINS PRIOR TO A MEAL FOR CRAMPING CAPSULE ORAL THREE TIMES A DAY
Qty: 90 TABLET | Refills: 6 | Status: ACTIVE | COMMUNITY
Start: 2020-07-31

## 2023-07-24 NOTE — HPI-TODAY'S VISIT:
7/24/2023 Mr. Arpit Cain is here for f/u of IBS constipation. He is not taking anything for his bowels. He is going 1-2 weeks inbetween. He has had more issues with depression since the death of his grandmother. He was her caregiver. He is considering doing hospice care. He is seeing his therapist. His medications have not changed. He continues to have nausea and bloating. CS

## 2023-07-24 NOTE — HPI-OTHER HISTORIES
7/23/2020  Mr. Arpit Cain is a 25 year old male here for abdominal pain and constipation. He has had constipation most of his life with a BM every 3-4 days. He has been trying to loss weight and eating low carb. He also hurt his back which has made his less active. He denies any narcotics. Over the last month, he has been having cramping after eating with bloating. He has diffuse abdominal pain. Having a BM sometimes helps. He has a hx of bulimia. He started at 15 and lost 75 pounds. He stopped for a few years and started again 2 years ago. He is now seeing a therapist and has stopped for the last 4 months. He denies any blood in his stool or melena. CS   8/27/2020 Mr. Arpit Cain is a 25 year old male here for f/u of abdominal pain and constipation. He had normal blood work at his last OV. He was started on the bowel regimen. He has been taking fiber and colace. He is not taking miralax. he was concerned about taking all of these. He is having more pain today. This is upper abdomen under his ribs and radiating into his back. He tried the bentyl and this did help but caused more constipation so he stopped it. CS   10/8/2020 Mr. Arpit Cani is a 25 year old male here for f/u of abdominal pain and constipation. He was having more pain and constipation at his last OV. He had a CT scan that showed some inflammation/infection in his lungs. He was not having symptoms had COVID a month prior. His CT did not show any GI abnormalities. He was started on trulance at his last OV. This caused so diarrhea at first and then gave him a more normal BM. When his bowels were moving better his pain was better. He has noticed that dairy, greens, and sweetners cause more issues. CS   2/4/2021 Mr. Arpit Cain is here for f/u of IBS-C. He has been on trulance with good result. He has a BM most days. He will some days and then have diarrhea but this is rare. He continues to have a lot of gas and bloating. He has noticed that dairy, greens, and sweetners cause more issues. He has been trying to follow the low FODMAP diet but would like help.  He is hoping to join a gym soon. He is in school [employer]. CS   4/26/2021 Mr. Arpit Cain is here for f/u of IBS-C. He had been on trulance with good result until recently. He is going a week at times between BM. He would like to come off it. He is having a lot of gas and bloating. The xifaxan did not help. He did not like modify health. He is taking with a friend of his that is a nutritionist. He has been having some rectal bleeding and itching. He thinks he may have hemorrhoids.  CS   5/21/2021 Mr. Arpit Cain is evaluated via telehealth for f/u of IBS. He had been constipation dominant until most recently. He was having a lot of diarrhea with trulance and linzess. He is now off this and feeling better. He continues to have cramping and nausea. Bentyl and zofran help with this. He has been diagnosed with central sleep apnea and getting a machine soon. He has cancelled his colonoscopy for now until he gets his sleep apnea better controlled.  He denies any further bleeding. CS  9/13/2021 Mr. Arpit Cain is here for f/u of IBS. He has constipation more often. He has been eating popcorn, walking more and drinking more water. He has been doing well and having mostly normal stool. He will still have some mucous but this is mainly wiht constipaiton. He had some green stool for a few days and then resolved. He has somce cramping in the morning after eating. Bentyl helps. Overall, he is feeling well and enjoying his job at [employer] and wanting to be a . CS  3/7/2022 Mr. Arpit Cain is here for f/u of IBS. He has constipation more often. He has been drinking more water and exercising more. His stools are often incomplete and he will have more bloating. He knows his stress and anxiety contribute. He is using more zofran. He has not needed the bentyl.  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 CS

## 2023-08-10 ENCOUNTER — WEB ENCOUNTER (OUTPATIENT)
Dept: URBAN - NONMETROPOLITAN AREA CLINIC 13 | Facility: CLINIC | Age: 28
End: 2023-08-10

## 2023-08-28 ENCOUNTER — WEB ENCOUNTER (OUTPATIENT)
Dept: URBAN - NONMETROPOLITAN AREA CLINIC 13 | Facility: CLINIC | Age: 28
End: 2023-08-28

## 2023-08-28 RX ORDER — HYDROCORTISONE ACETATE AND PRAMOXINE HYDROCHLORIDE 25; 10 MG/G; MG/G
1 APPLICATION CREAM TOPICAL THREE TIMES A DAY
Qty: 1 TUBE | Refills: 3
End: 2023-10-23

## 2023-09-06 ENCOUNTER — OFFICE VISIT (OUTPATIENT)
Dept: URBAN - METROPOLITAN AREA TELEHEALTH 2 | Facility: TELEHEALTH | Age: 28
End: 2023-09-06
Payer: COMMERCIAL

## 2023-09-06 ENCOUNTER — LAB OUTSIDE AN ENCOUNTER (OUTPATIENT)
Dept: URBAN - NONMETROPOLITAN AREA CLINIC 2 | Facility: CLINIC | Age: 28
End: 2023-09-06

## 2023-09-06 ENCOUNTER — LAB OUTSIDE AN ENCOUNTER (OUTPATIENT)
Dept: URBAN - METROPOLITAN AREA TELEHEALTH 2 | Facility: TELEHEALTH | Age: 28
End: 2023-09-06

## 2023-09-06 VITALS — BODY MASS INDEX: 31.5 KG/M2 | HEART RATE: 75 BPM | WEIGHT: 220 LBS | HEIGHT: 70 IN

## 2023-09-06 DIAGNOSIS — K58.1 IRRITABLE BOWEL SYNDROME WITH CONSTIPATION: ICD-10-CM

## 2023-09-06 DIAGNOSIS — A04.8 BACTERIAL INFECTION DUE TO H. PYLORI: ICD-10-CM

## 2023-09-06 DIAGNOSIS — F50.2 BULIMIA: ICD-10-CM

## 2023-09-06 DIAGNOSIS — K62.5 RECTAL BLEEDING: ICD-10-CM

## 2023-09-06 PROCEDURE — 91065 BREATH HYDROGEN/METHANE TEST: CPT | Performed by: NURSE PRACTITIONER

## 2023-09-06 PROCEDURE — 99214 OFFICE O/P EST MOD 30 MIN: CPT | Performed by: NURSE PRACTITIONER

## 2023-09-06 RX ORDER — CYCLOBENZAPRINE HYDROCHLORIDE 10 MG/1
TABLET, FILM COATED ORAL
Qty: 30 TABLET | Status: ACTIVE | COMMUNITY

## 2023-09-06 RX ORDER — PLECANATIDE 3 MG/1
1 TABLET TABLET ORAL ONCE A DAY
Qty: 90 | Refills: 3 | Status: ACTIVE | COMMUNITY

## 2023-09-06 RX ORDER — LUBIPROSTONE 24 UG/1
1 CAPSULE WITH FOOD AND WATER CAPSULE, GELATIN COATED ORAL TWICE A DAY
Qty: 60 | Refills: 3 | Status: ACTIVE | COMMUNITY
Start: 2023-07-27 | End: 2023-11-23

## 2023-09-06 RX ORDER — DICYCLOMINE HYDROCHLORIDE 10 MG/1
1 TABLET 30 MINS PRIOR TO A MEAL FOR CRAMPING CAPSULE ORAL THREE TIMES A DAY
Qty: 90 TABLET | Refills: 6 | Status: ACTIVE | COMMUNITY
Start: 2020-07-31

## 2023-09-06 RX ORDER — TRAZODONE HYDROCHLORIDE 150 MG/1
TABLET ORAL
Qty: 90 TABLET | Status: ACTIVE | COMMUNITY

## 2023-09-06 RX ORDER — LAMOTRIGINE 150 MG/1
TAKE 1 TABLET BY MOUTH ONCE DAILY TABLET ORAL
Qty: 90 EACH | Refills: 0 | Status: ACTIVE | COMMUNITY

## 2023-09-06 RX ORDER — PRUCALOPRIDE 2 MG/1
1 TABLET TABLET, FILM COATED ORAL ONCE A DAY
Qty: 90 TABLET | Refills: 3 | Status: ACTIVE | COMMUNITY
Start: 2023-07-24 | End: 2024-07-18

## 2023-09-06 RX ORDER — AMITRIPTYLINE HYDROCHLORIDE 10 MG/1
1 TABLET AT BEDTIME TABLET, FILM COATED ORAL ONCE A DAY
Qty: 30 | Refills: 11 | OUTPATIENT

## 2023-09-06 RX ORDER — PRUCALOPRIDE 2 MG/1
1 TABLET TABLET, FILM COATED ORAL ONCE A DAY
Qty: 90 TABLET | Refills: 3 | OUTPATIENT

## 2023-09-06 RX ORDER — POLYETHYLENE GLYCOL 3350, SODIUM SULFATE, SODIUM CHLORIDE, POTASSIUM CHLORIDE, ASCORBIC ACID, SODIUM ASCORBATE 140-9-5.2G
AS DIRECTED KIT ORAL
Qty: 1 BOX | Refills: 0 | OUTPATIENT
Start: 2023-09-06 | End: 2023-09-08

## 2023-09-06 RX ORDER — ONDANSETRON HYDROCHLORIDE 4 MG/1
1 TABLET EVERY 6-8HRS AS NEEDED FOR NAUSEA TABLET, FILM COATED ORAL
Qty: 30 | Refills: 3 | Status: ACTIVE | COMMUNITY

## 2023-09-06 RX ORDER — AMITRIPTYLINE HYDROCHLORIDE 10 MG/1
1 TABLET AT BEDTIME TABLET, FILM COATED ORAL ONCE A DAY
Qty: 30 | Refills: 11 | Status: ACTIVE | COMMUNITY
Start: 2023-07-24

## 2023-09-06 RX ORDER — HYDROCORTISONE ACETATE AND PRAMOXINE HYDROCHLORIDE 25; 10 MG/G; MG/G
1 APPLICATION CREAM TOPICAL THREE TIMES A DAY
Qty: 1 TUBE | Refills: 3 | Status: ACTIVE | COMMUNITY
End: 2023-10-23

## 2023-09-06 NOTE — HPI-OTHER HISTORIES
7/23/2020  Mr. Arpit Cain is a 25 year old male here for abdominal pain and constipation. He has had constipation most of his life with a BM every 3-4 days. He has been trying to loss weight and eating low carb. He also hurt his back which has made his less active. He denies any narcotics. Over the last month, he has been having cramping after eating with bloating. He has diffuse abdominal pain. Having a BM sometimes helps. He has a hx of bulimia. He started at 15 and lost 75 pounds. He stopped for a few years and started again 2 years ago. He is now seeing a therapist and has stopped for the last 4 months. He denies any blood in his stool or melena. CS   8/27/2020 Mr. Arpit Cain is a 25 year old male here for f/u of abdominal pain and constipation. He had normal blood work at his last OV. He was started on the bowel regimen. He has been taking fiber and colace. He is not taking miralax. he was concerned about taking all of these. He is having more pain today. This is upper abdomen under his ribs and radiating into his back. He tried the bentyl and this did help but caused more constipation so he stopped it. CS   10/8/2020 Mr. Arpit Cain is a 25 year old male here for f/u of abdominal pain and constipation. He was having more pain and constipation at his last OV. He had a CT scan that showed some inflammation/infection in his lungs. He was not having symptoms had COVID a month prior. His CT did not show any GI abnormalities. He was started on trulance at his last OV. This caused so diarrhea at first and then gave him a more normal BM. When his bowels were moving better his pain was better. He has noticed that dairy, greens, and sweetners cause more issues. CS   2/4/2021 Mr. Arpit Cain is here for f/u of IBS-C. He has been on trulance with good result. He has a BM most days. He will some days and then have diarrhea but this is rare. He continues to have a lot of gas and bloating. He has noticed that dairy, greens, and sweetners cause more issues. He has been trying to follow the low FODMAP diet but would like help.  He is hoping to join a gym soon. He is in school [employer]. CS   4/26/2021 Mr. Arpit Cain is here for f/u of IBS-C. He had been on trulance with good result until recently. He is going a week at times between BM. He would like to come off it. He is having a lot of gas and bloating. The xifaxan did not help. He did not like modify health. He is taking with a friend of his that is a nutritionist. He has been having some rectal bleeding and itching. He thinks he may have hemorrhoids.  CS   5/21/2021 Mr. Arpit Cain is evaluated via telehealth for f/u of IBS. He had been constipation dominant until most recently. He was having a lot of diarrhea with trulance and linzess. He is now off this and feeling better. He continues to have cramping and nausea. Bentyl and zofran help with this. He has been diagnosed with central sleep apnea and getting a machine soon. He has cancelled his colonoscopy for now until he gets his sleep apnea better controlled.  He denies any further bleeding. CS  9/13/2021 Mr. Arpit Cain is here for f/u of IBS. He has constipation more often. He has been eating popcorn, walking more and drinking more water. He has been doing well and having mostly normal stool. He will still have some mucous but this is mainly wiht constipaiton. He had some green stool for a few days and then resolved. He has somce cramping in the morning after eating. Bentyl helps. Overall, he is feeling well and enjoying his job at [employer] and wanting to be a . CS  3/7/2022 Mr. Arpit Cain is here for f/u of IBS. He has constipation more often. He has been drinking more water and exercising more. His stools are often incomplete and he will have more bloating. He knows his stress and anxiety contribute. He is using more zofran. He has not needed the bentyl.  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 CS  7/24/2023 Mr. Arpit Cain is here for f/u of IBS constipation. He is not taking anything for his bowels. He is going 1-2 weeks inbetween. He has had more issues with depression since the death of his grandmother. He was her caregiver. He is considering doing hospice care. He is seeing his therapist. His medications have not changed. He continues to have nausea and bloating. CS

## 2023-09-06 NOTE — HPI-TODAY'S VISIT:
9/6/2023 Mr. Arpit Cain is evaluated via telehealth for f/u of IBS constipation. At his last OV, he has been off all laxatives. In the past he had tried linzess and amitiza. Both gave him diarrhea. He was started on motegrity with samples. He took this for a week. He felt while taking it but thinks it gave him diarrhea. He then took the amitiza again and this gave him 5-6 BM a day. He called and was advised to return to OTC miraalax daily and dulcolax every 2-3 days. He is taking this and not having a BM for a week. 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 He is concerned about starting any thing new for fear of diarrhea. He tried the AMT along with the trazadone and he was very sleepy. He does think it will help. He is seeing his therapist soon. He continues to have gas and bloating which he attributes to his constipation. CS

## 2023-09-08 LAB — H PYLORI BREATH TEST: NOT DETECTED

## 2023-09-12 ENCOUNTER — TELEPHONE ENCOUNTER (OUTPATIENT)
Dept: URBAN - NONMETROPOLITAN AREA CLINIC 2 | Facility: CLINIC | Age: 28
End: 2023-09-12

## 2023-09-12 ENCOUNTER — WEB ENCOUNTER (OUTPATIENT)
Dept: URBAN - NONMETROPOLITAN AREA CLINIC 13 | Facility: CLINIC | Age: 28
End: 2023-09-12

## 2023-11-17 ENCOUNTER — OFFICE VISIT (OUTPATIENT)
Dept: URBAN - METROPOLITAN AREA MEDICAL CENTER 1 | Facility: MEDICAL CENTER | Age: 28
End: 2023-11-17
Payer: COMMERCIAL

## 2023-11-17 DIAGNOSIS — R19.4 ALTERATION IN BOWEL ELIMINATION: ICD-10-CM

## 2023-11-17 PROCEDURE — 45378 DIAGNOSTIC COLONOSCOPY: CPT | Performed by: INTERNAL MEDICINE

## 2023-12-11 ENCOUNTER — OFFICE VISIT (OUTPATIENT)
Dept: URBAN - NONMETROPOLITAN AREA CLINIC 13 | Facility: CLINIC | Age: 28
End: 2023-12-11
Payer: COMMERCIAL

## 2023-12-11 ENCOUNTER — DASHBOARD ENCOUNTERS (OUTPATIENT)
Age: 28
End: 2023-12-11

## 2023-12-11 VITALS
SYSTOLIC BLOOD PRESSURE: 125 MMHG | BODY MASS INDEX: 32.21 KG/M2 | HEIGHT: 70 IN | DIASTOLIC BLOOD PRESSURE: 79 MMHG | HEART RATE: 87 BPM | WEIGHT: 225 LBS

## 2023-12-11 DIAGNOSIS — K62.5 RECTAL BLEEDING: ICD-10-CM

## 2023-12-11 DIAGNOSIS — K58.1 IRRITABLE BOWEL SYNDROME WITH CONSTIPATION: ICD-10-CM

## 2023-12-11 DIAGNOSIS — Z83.719 FAMILY HISTORY OF COLONIC POLYPS: ICD-10-CM

## 2023-12-11 DIAGNOSIS — A04.8 BACTERIAL INFECTION DUE TO H. PYLORI: ICD-10-CM

## 2023-12-11 PROBLEM — 82934008: Status: ACTIVE | Noted: 2023-07-24

## 2023-12-11 PROBLEM — 440630006: Status: ACTIVE | Noted: 2020-10-08

## 2023-12-11 PROCEDURE — 99214 OFFICE O/P EST MOD 30 MIN: CPT | Performed by: NURSE PRACTITIONER

## 2023-12-11 RX ORDER — AMITRIPTYLINE HYDROCHLORIDE 10 MG/1
1 TABLET AT BEDTIME TABLET, FILM COATED ORAL ONCE A DAY
Qty: 30 | Refills: 11 | Status: ACTIVE | COMMUNITY

## 2023-12-11 RX ORDER — DICYCLOMINE HYDROCHLORIDE 10 MG/1
1 TABLET 30 MINS PRIOR TO A MEAL FOR CRAMPING CAPSULE ORAL THREE TIMES A DAY
Qty: 90 TABLET | Refills: 6 | Status: ACTIVE | COMMUNITY
Start: 2020-07-31

## 2023-12-11 RX ORDER — PLECANATIDE 3 MG/1
1 TABLET TABLET ORAL ONCE A DAY
Qty: 90 | Refills: 3 | Status: ACTIVE | COMMUNITY

## 2023-12-11 RX ORDER — CYCLOBENZAPRINE HYDROCHLORIDE 10 MG/1
TABLET, FILM COATED ORAL
Qty: 30 TABLET | Status: ACTIVE | COMMUNITY

## 2023-12-11 RX ORDER — LAMOTRIGINE 150 MG/1
TAKE 1 TABLET BY MOUTH ONCE DAILY TABLET ORAL
Qty: 90 EACH | Refills: 0 | Status: ACTIVE | COMMUNITY

## 2023-12-11 RX ORDER — PRUCALOPRIDE 2 MG/1
1 TABLET TABLET, FILM COATED ORAL ONCE A DAY
Qty: 90 TABLET | Refills: 3 | Status: ACTIVE | COMMUNITY

## 2023-12-11 RX ORDER — TRAZODONE HYDROCHLORIDE 150 MG/1
TABLET ORAL
Qty: 90 TABLET | Status: ACTIVE | COMMUNITY

## 2023-12-11 RX ORDER — ONDANSETRON HYDROCHLORIDE 4 MG/1
1 TABLET EVERY 6-8HRS AS NEEDED FOR NAUSEA TABLET, FILM COATED ORAL
Qty: 30 | Refills: 3 | Status: ACTIVE | COMMUNITY

## 2023-12-11 NOTE — HPI-OTHER HISTORIES
7/23/2020  Mr. Arpit Cain is a 25 year old male here for abdominal pain and constipation. He has had constipation most of his life with a BM every 3-4 days. He has been trying to loss weight and eating low carb. He also hurt his back which has made his less active. He denies any narcotics. Over the last month, he has been having cramping after eating with bloating. He has diffuse abdominal pain. Having a BM sometimes helps. He has a hx of bulimia. He started at 15 and lost 75 pounds. He stopped for a few years and started again 2 years ago. He is now seeing a therapist and has stopped for the last 4 months. He denies any blood in his stool or melena. CS   8/27/2020 Mr. Arpit Cain is a 25 year old male here for f/u of abdominal pain and constipation. He had normal blood work at his last OV. He was started on the bowel regimen. He has been taking fiber and colace. He is not taking miralax. he was concerned about taking all of these. He is having more pain today. This is upper abdomen under his ribs and radiating into his back. He tried the bentyl and this did help but caused more constipation so he stopped it. CS   10/8/2020 Mr. Arpit Cain is a 25 year old male here for f/u of abdominal pain and constipation. He was having more pain and constipation at his last OV. He had a CT scan that showed some inflammation/infection in his lungs. He was not having symptoms had COVID a month prior. His CT did not show any GI abnormalities. He was started on trulance at his last OV. This caused so diarrhea at first and then gave him a more normal BM. When his bowels were moving better his pain was better. He has noticed that dairy, greens, and sweetners cause more issues. CS   2/4/2021 Mr. Arpit Cain is here for f/u of IBS-C. He has been on trulance with good result. He has a BM most days. He will some days and then have diarrhea but this is rare. He continues to have a lot of gas and bloating. He has noticed that dairy, greens, and sweetners cause more issues. He has been trying to follow the low FODMAP diet but would like help.  He is hoping to join a gym soon. He is in school foro Social Work. CS   4/26/2021 Mr. Arpit Cain is here for f/u of IBS-C. He had been on trulance with good result until recently. He is going a week at times between BM. He would like to come off it. He is having a lot of gas and bloating. The xifaxan did not help. He did not like modify health. He is taking with a friend of his that is a nutritionist. He has been having some rectal bleeding and itching. He thinks he may have hemorrhoids.  CS   5/21/2021 Mr. Arpit Cain is evaluated via telehealth for f/u of IBS. He had been constipation dominant until most recently. He was having a lot of diarrhea with trulance and linzess. He is now off this and feeling better. He continues to have cramping and nausea. Bentyl and zofran help with this. He has been diagnosed with central sleep apnea and getting a machine soon. He has cancelled his colonoscopy for now until he gets his sleep apnea better controlled.  He denies any further bleeding. CS  9/13/2021 Mr. Arpit Cain is here for f/u of IBS. He has constipation more often. He has been eating popcorn, walking more and drinking more water. He has been doing well and having mostly normal stool. He will still have some mucous but this is mainly wiht constipaiton. He had some green stool for a few days and then resolved. He has somce cramping in the morning after eating. Bentyl helps. Overall, he is feeling well and enjoying his job at [employer] and wanting to be a . CS  3/7/2022 Mr. Arpit Cain is here for f/u of IBS. He has constipation more often. He has been drinking more water and exercising more. His stools are often incomplete and he will have more bloating. He knows his stress and anxiety contribute. He is using more zofran. He has not needed the bentyl.  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 CS  7/24/2023 Mr. Arpit Cain is here for f/u of IBS constipation. He is not taking anything for his bowels. He is going 1-2 weeks inbetween. He has had more issues with depression since the death of his grandmother. He was her caregiver. He is considering doing hospice care. He is seeing his therapist. His medications have not changed. He continues to have nausea and bloating. CS  9/6/2023 Mr. Arpit Cain is evaluated via telehealth for f/u of IBS constipation. At his last OV, he has been off all laxatives. In the past he had tried linzess and amitiza. Both gave him diarrhea. He was started on motegrity with samples. He took this for a week. He felt while taking it but thinks it gave him diarrhea. He then took the amitiza again and this gave him 5-6 BM a day. He called and was advised to return to OTC miraalax daily and dulcolax every 2-3 days. He is taking this and not having a BM for a week. 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 He is concerned about starting any thing new for fear of diarrhea. He tried the AMT along with the trazadone and he was very sleepy. He does think it will help. He is seeing his therapist soon. He continues to have gas and bloating which he attributes to his constipation. CS  11/17/2023 Colonoscopy:

## 2023-12-11 NOTE — HPI-TODAY'S VISIT:
12/11/2023 Mr. Arpit Cain is here for f/u of IBS constipation. He has been taking amitiza BID and fiber. This helps and he has a BM every other day but he has a lot of nausea and bloating every time he eats. After the colonoscopy, he felt better and he was going more. As time went on, his symptoms returned. He is off the AMT. He is hoping to get into Grad school and start in June. CS

## 2024-02-12 ENCOUNTER — OV EP (OUTPATIENT)
Dept: URBAN - NONMETROPOLITAN AREA CLINIC 13 | Facility: CLINIC | Age: 29
End: 2024-02-12

## 2024-03-01 ENCOUNTER — OV EP (OUTPATIENT)
Dept: URBAN - NONMETROPOLITAN AREA CLINIC 13 | Facility: CLINIC | Age: 29
End: 2024-03-01

## 2024-03-04 ENCOUNTER — OV EP (OUTPATIENT)
Dept: URBAN - NONMETROPOLITAN AREA CLINIC 13 | Facility: CLINIC | Age: 29
End: 2024-03-04

## 2024-04-10 ENCOUNTER — OV EP (OUTPATIENT)
Dept: URBAN - NONMETROPOLITAN AREA CLINIC 13 | Facility: CLINIC | Age: 29
End: 2024-04-10

## 2024-06-10 ENCOUNTER — OFFICE VISIT (OUTPATIENT)
Dept: URBAN - NONMETROPOLITAN AREA CLINIC 13 | Facility: CLINIC | Age: 29
End: 2024-06-10

## 2024-07-30 NOTE — HPI-TODAY'S VISIT:
Mr. Arpit Cain is a 25 year old male here for f/u of abdominal pain and constipation. He had normal blood work at his last OV. He was started on the bowel regimen. He has been taking fiber and colace. He is not taking miralax. he was concerned about taking all of these. He is having more pain today. This is upper abdomen under his ribs and radiating into his back. He tried the bentyl and this did help but caused more constipation so he stopped it. CS Patient is disorganized and illogical , with poor concentration. Her behaviors and attitude is suspicious, paranoid and responding to internal stimuli.  Patient has bizarre appearance with worried affect. Patient only accepted Ativan PO in the morning, refused rest of the medication.   Impulsive behaviors not  observed.  Patient refused to eat, was sipping water and sometimes she was spitting it on her shirt.